# Patient Record
Sex: MALE | NOT HISPANIC OR LATINO | URBAN - METROPOLITAN AREA
[De-identification: names, ages, dates, MRNs, and addresses within clinical notes are randomized per-mention and may not be internally consistent; named-entity substitution may affect disease eponyms.]

---

## 2020-11-30 ENCOUNTER — OFFICE VISIT (OUTPATIENT)
Dept: URBAN - METROPOLITAN AREA CLINIC 72 | Facility: CLINIC | Age: 60
End: 2020-11-30
Payer: COMMERCIAL

## 2020-11-30 DIAGNOSIS — H44.23 DEGENERATIVE MYOPIA, BILATERAL: Primary | ICD-10-CM

## 2020-11-30 DIAGNOSIS — H25.813 COMBINED FORMS OF AGE-RELATED CATARACT, BILATERAL: ICD-10-CM

## 2020-11-30 PROCEDURE — 99204 OFFICE O/P NEW MOD 45 MIN: CPT | Performed by: OPTOMETRIST

## 2020-11-30 ASSESSMENT — INTRAOCULAR PRESSURE
OS: 11
OD: 13

## 2020-11-30 NOTE — IMPRESSION/PLAN
Impression: Combined forms of age-related cataract, bilateral: H25.813 - Cataracts do not require treatment unless they interfere with vision and impact one's activities of daily living, in which case cataract extraction with lens implant insertion should be performed. Plan: Observe. Cataracts occur in everyone as they age. Contact office if you experience progressive loss of vision, increasing glare, and problems with daily activities such as driving, reading, watching tv, seeing street signs, or following a golf ball.

## 2020-11-30 NOTE — IMPRESSION/PLAN
Impression: Degenerative myopia, bilateral: H44.23 - exam and OPTOS consistent with stable myopic degen w/out RD or tears in either eye Plan: Observe.  
Pt to return for vis/CL exam tomorrow

## 2020-12-01 ENCOUNTER — TESTING ONLY (OUTPATIENT)
Dept: URBAN - METROPOLITAN AREA CLINIC 72 | Facility: CLINIC | Age: 60
End: 2020-12-01
Payer: COMMERCIAL

## 2020-12-01 DIAGNOSIS — H25.13 AGE-RELATED NUCLEAR CATARACT, BILATERAL: ICD-10-CM

## 2020-12-01 DIAGNOSIS — H52.13 MYOPIA, BILATERAL: Primary | ICD-10-CM

## 2020-12-01 PROCEDURE — 92012 INTRM OPH EXAM EST PATIENT: CPT | Performed by: OPTOMETRIST

## 2020-12-01 PROCEDURE — 92310 CONTACT LENS FITTING OU: CPT | Performed by: OPTOMETRIST

## 2020-12-01 PROCEDURE — 99212 OFFICE O/P EST SF 10 MIN: CPT | Performed by: OPTOMETRIST

## 2020-12-01 ASSESSMENT — VISUAL ACUITY
OD: 20/30
OS: 20/40

## 2020-12-01 NOTE — IMPRESSION/PLAN
Impression: Myopia, bilateral: H52.13. Plan: A glasses and contact lens Rx has been generated and dispensed. Pt to call with any concerns.

## 2022-07-22 ENCOUNTER — OFFICE VISIT (OUTPATIENT)
Dept: URBAN - METROPOLITAN AREA CLINIC 72 | Facility: CLINIC | Age: 62
End: 2022-07-22
Payer: COMMERCIAL

## 2022-07-22 DIAGNOSIS — H44.23 DEGENERATIVE MYOPIA, BILATERAL: ICD-10-CM

## 2022-07-22 DIAGNOSIS — H52.13 MYOPIA, BILATERAL: Primary | ICD-10-CM

## 2022-07-22 PROCEDURE — 92012 INTRM OPH EXAM EST PATIENT: CPT | Performed by: OPTOMETRIST

## 2022-07-22 PROCEDURE — 92310 CONTACT LENS FITTING OU: CPT | Performed by: OPTOMETRIST

## 2022-07-22 ASSESSMENT — INTRAOCULAR PRESSURE
OD: 14
OS: 13

## 2022-07-22 ASSESSMENT — VISUAL ACUITY
OS: 20/25
OD: 20/30